# Patient Record
Sex: FEMALE | Race: WHITE | NOT HISPANIC OR LATINO | Employment: UNEMPLOYED | URBAN - METROPOLITAN AREA
[De-identification: names, ages, dates, MRNs, and addresses within clinical notes are randomized per-mention and may not be internally consistent; named-entity substitution may affect disease eponyms.]

---

## 2022-09-08 ENCOUNTER — OFFICE VISIT (OUTPATIENT)
Dept: OBSTETRICS AND GYNECOLOGY | Facility: CLINIC | Age: 32
End: 2022-09-08
Payer: MEDICAID

## 2022-09-08 VITALS
HEART RATE: 82 BPM | WEIGHT: 155.81 LBS | HEIGHT: 65 IN | RESPIRATION RATE: 18 BRPM | OXYGEN SATURATION: 100 % | SYSTOLIC BLOOD PRESSURE: 124 MMHG | BODY MASS INDEX: 25.96 KG/M2 | DIASTOLIC BLOOD PRESSURE: 72 MMHG

## 2022-09-08 DIAGNOSIS — N64.4 BREAST PAIN, LEFT: ICD-10-CM

## 2022-09-08 DIAGNOSIS — R10.2 PELVIC PAIN: ICD-10-CM

## 2022-09-08 DIAGNOSIS — Z01.419 ENCOUNTER FOR ANNUAL ROUTINE GYNECOLOGICAL EXAMINATION: Primary | ICD-10-CM

## 2022-09-08 PROCEDURE — 3008F PR BODY MASS INDEX (BMI) DOCUMENTED: ICD-10-PCS | Mod: CPTII,S$GLB,, | Performed by: ADVANCED PRACTICE MIDWIFE

## 2022-09-08 PROCEDURE — 3074F PR MOST RECENT SYSTOLIC BLOOD PRESSURE < 130 MM HG: ICD-10-PCS | Mod: CPTII,S$GLB,, | Performed by: ADVANCED PRACTICE MIDWIFE

## 2022-09-08 PROCEDURE — 3008F BODY MASS INDEX DOCD: CPT | Mod: CPTII,S$GLB,, | Performed by: ADVANCED PRACTICE MIDWIFE

## 2022-09-08 PROCEDURE — 3044F PR MOST RECENT HEMOGLOBIN A1C LEVEL <7.0%: ICD-10-PCS | Mod: CPTII,S$GLB,, | Performed by: ADVANCED PRACTICE MIDWIFE

## 2022-09-08 PROCEDURE — 3078F DIAST BP <80 MM HG: CPT | Mod: CPTII,S$GLB,, | Performed by: ADVANCED PRACTICE MIDWIFE

## 2022-09-08 PROCEDURE — 99385 PREV VISIT NEW AGE 18-39: CPT | Mod: S$GLB,,, | Performed by: ADVANCED PRACTICE MIDWIFE

## 2022-09-08 PROCEDURE — 88175 CYTOPATH C/V AUTO FLUID REDO: CPT | Performed by: ADVANCED PRACTICE MIDWIFE

## 2022-09-08 PROCEDURE — 87624 HPV HI-RISK TYP POOLED RSLT: CPT | Performed by: ADVANCED PRACTICE MIDWIFE

## 2022-09-08 PROCEDURE — 99385 PR PREVENTIVE VISIT,NEW,18-39: ICD-10-PCS | Mod: S$GLB,,, | Performed by: ADVANCED PRACTICE MIDWIFE

## 2022-09-08 PROCEDURE — 3044F HG A1C LEVEL LT 7.0%: CPT | Mod: CPTII,S$GLB,, | Performed by: ADVANCED PRACTICE MIDWIFE

## 2022-09-08 PROCEDURE — 1159F MED LIST DOCD IN RCRD: CPT | Mod: CPTII,S$GLB,, | Performed by: ADVANCED PRACTICE MIDWIFE

## 2022-09-08 PROCEDURE — 3078F PR MOST RECENT DIASTOLIC BLOOD PRESSURE < 80 MM HG: ICD-10-PCS | Mod: CPTII,S$GLB,, | Performed by: ADVANCED PRACTICE MIDWIFE

## 2022-09-08 PROCEDURE — 87491 CHLMYD TRACH DNA AMP PROBE: CPT | Performed by: ADVANCED PRACTICE MIDWIFE

## 2022-09-08 PROCEDURE — 3074F SYST BP LT 130 MM HG: CPT | Mod: CPTII,S$GLB,, | Performed by: ADVANCED PRACTICE MIDWIFE

## 2022-09-08 PROCEDURE — 87591 N.GONORRHOEAE DNA AMP PROB: CPT | Performed by: ADVANCED PRACTICE MIDWIFE

## 2022-09-08 PROCEDURE — 1159F PR MEDICATION LIST DOCUMENTED IN MEDICAL RECORD: ICD-10-PCS | Mod: CPTII,S$GLB,, | Performed by: ADVANCED PRACTICE MIDWIFE

## 2022-09-08 NOTE — PROGRESS NOTES
"CC: Well woman exam    Sylvia Terry is a 31 y.o. female  presents for well woman exam.  LMP: Patient's last menstrual period was 2022.. Patients last pap was 2021.  Last wellness labs were done 2021. She is a non smoker . Denies any anxiety and depression. She uses a seat belt while riding in the car.  Eating well and exercising.  no sexually active.The current method of family planning is non (not sexually active). Alicia describes pain to left breast for past 2 weeks. Originates deep under armpit area and radiates to nipple. She denies any trauma to breast or surrounding tissues. No discharge from nipples. Additionally, she reports pelvic pain. Pain occurs in adnexal area and radiates to uterus and pubis. At times, affects ability to care for self and special needs child. Pain is "dull" at present time and not acute. Often alternates side occurs upon. Denies vaginal discharge, odor, dysuria, fever/chills, or CVAT. No hx of STDs or abnormal PAP Smears.      Chief Complaint   Patient presents with    Well Woman     Patient is here for annual LPAP 2021 and Blood work        Past Medical History:   Diagnosis Date    Anemia     Asthma     Mental disorder      Past Surgical History:   Procedure Laterality Date     SECTION      KNEE SURGERY      WISDOM TOOTH EXTRACTION       Social History     Socioeconomic History    Marital status: Single   Tobacco Use    Smoking status: Former     Types: Cigarettes    Smokeless tobacco: Never   Substance and Sexual Activity    Alcohol use: Not Currently    Drug use: Never    Sexual activity: Not Currently     Family History   Problem Relation Age of Onset    Breast cancer Maternal Grandmother     Hypertension Father     Diabetes Father     Hypertension Mother     Diabetes Mother      OB History          1    Para   1    Term           1    AB        Living   1         SAB        IAB        Ectopic        Multiple        Live Births " "  1               Current Outpatient Medications on File Prior to Visit   Medication Sig Dispense Refill    multivitamin (THERAGRAN) tablet Take 1 tablet by mouth once daily.       No current facility-administered medications on file prior to visit.        ROS:  Review of Systems   Constitutional: Negative.    HENT: Negative.     Eyes: Negative.    Respiratory: Negative.     Cardiovascular: Negative.    Gastrointestinal: Negative.    Endocrine: Negative.    Genitourinary:  Positive for pelvic pain.   Musculoskeletal: Negative.    Integumentary:  Positive for breast tenderness. Negative.   Neurological: Negative.    Hematological: Negative.    Psychiatric/Behavioral: Negative.     Breast: Positive for breast self exam and tenderness.     /72   Pulse 82   Resp 18   Ht 5' 5" (1.651 m)   Wt 70.7 kg (155 lb 12.8 oz)   LMP 09/02/2022   SpO2 100%   BMI 25.93 kg/m²     PHYSICAL EXAM:  Physical Exam:   Constitutional: She is oriented to person, place, and time. She appears well-developed and well-nourished. No distress.    HENT:   Head: Normocephalic.    Eyes: Right eye exhibits no discharge. Left eye exhibits no discharge. No scleral icterus.    Neck: No tracheal deviation present. No thyromegaly present.    Cardiovascular:  Normal rate, regular rhythm and normal heart sounds.      Exam reveals no gallop, no friction rub, no clubbing, no cyanosis and no edema.       No murmur heard.   Pulmonary/Chest: She is in respiratory distress. She has no wheezes. She has rales. She exhibits no tenderness. Left breast exhibits tenderness.            Abdominal: Soft. She exhibits no distension, no mass and no abdominal incision. There is abdominal tenderness. There is guarding. There is no rebound.   Mild tenderness to right adnexal area with deep palpation.     Genitourinary:    Vagina and uterus normal.   No  no vaginal discharge in the vagina.           Musculoskeletal: Normal range of motion and moves all extremeties. " No tenderness or edema.       Neurological: She is alert and oriented to person, place, and time. No cranial nerve deficit. Coordination normal.    Skin: Skin is warm. No rash noted. She is not diaphoretic. No cyanosis or erythema. No pallor. Nails show no clubbing.    Psychiatric: She has a normal mood and affect. Her behavior is normal. Judgment and thought content normal.      Breast exam: Breasts are symmetrical and fibrocystic in nature. No discharge from nipples or obvious abnormalities. + tenderness from under left armpit to nipple. Mild. See diagram.   Pelvic exam performed. PAP obtained, no abnormal discharge or contact spotting. No CMT. Uterus not enlarged. No significant tenderness to left or right adnexa.      Encounter for annual routine gynecological examination  -     Liquid-Based Pap Smear, Screening  -     HPV High Risk Genotypes, PCR  -     Lipid Panel; Future; Expected date: 09/08/2022  -     Hemoglobin A1C; Future; Expected date: 09/08/2022  -     C. trachomatis/N. gonorrhoeae by AMP DNA [facility]; Cervix    Pelvic pain  -     Cancel: US OB/GYN Procedure ([facility])-Future; Future; Expected date: 09/22/2022  -     US OB/GYN Procedure ([facility])-Future; Future; Expected date: 09/22/2022  -     C. trachomatis/N. gonorrhoeae by AMP DNA [facility]; Cervix    Breast pain, left  -     Cancel: US Breast Left Complete; Future; Expected date: 09/22/2022  -     US Breast Left Complete; Future; Expected date: 09/22/2022    Orders Placed This Encounter   Procedures    HPV High Risk Genotypes, PCR     Release to patient->Immediate     Order Specific Question:   Source     Answer:   [facility]     Order Specific Question:   Release to patient     Answer:   Immediate    C. trachomatis/N. gonorrhoeae by AMP DNA [facility]; Cervix     Sources by Resulting Lab:->[facility]     Order Specific Question:   Sources by Resulting Lab:     Answer:   [facility]     Order Specific Question:   Source:     Answer:   [facility]    US Breast Left  Complete     Standing Status:   Future     Standing Expiration Date:   9/8/2024     Order Specific Question:   Reason for Exam:     Answer:   pain to left breast and left arm pit     Order Specific Question:   Is the patient pregnant?     Answer:   No     Order Specific Question:   Has patient had radiation?     Answer:   No     Order Specific Question:   Has the patient had chemotherapy?     Answer:   No     Order Specific Question:   May the Radiologist modify the order per protocol to meet the clinical needs of the patient?     Answer:   Yes     Order Specific Question:   Does the patient have breast implants?     Answer:   No     Order Specific Question:   Release to patient     Answer:   Immediate    Lipid Panel     Standing Status:   Future     Number of Occurrences:   1     Standing Expiration Date:   9/8/2023    Hemoglobin A1C     Standing Status:   Future     Number of Occurrences:   1     Standing Expiration Date:   9/8/2023    US OB/GYN Procedure ([facility])-Future     Standing Status:   Future     Standing Expiration Date:   9/8/2023     Scheduling Instructions:      Pelvic pain. US of uterus and ovaries     Order Specific Question:   Procedures to be performed:     Answer:   GYN US- Non pregnant     Order Specific Question:   Release to patient     Answer:   Immediate      Annual gyn exam performed.   Wellness labs ordered/discussed.  PAP and GC CHL screening collected.  Discussed pelvic pain c/w ovarian cysts, however a US of uterus/adnexa was ordered as precaution. We discussed ovarian cysts and possible treatments. She does not desire any type of hormonal management and plans NSAIDs PRN.  Discussed breast pain/exam including self SBE and fibrocystic breasts. US of left breasts ordered/discussed.  F/U with me will be based upon lab and US findings. Plan use of [facility] patient portal for communication at present time and she is happy with that.  I did discuss briefly use of condoms for STD prevention.  She is not sexually active at this time and is well versed in STD prevention. Care of her special needs child is main focus in her life.  Importance of healthy diet, regular exercise, and stress management skills discussed.  Recommend annual gyn exam. She recently obtained new primary care provider and plans to see them for all non gyn needs/concerns.

## 2022-09-09 LAB
C TRACH DNA SPEC QL NAA+PROBE: NOT DETECTED
N GONORRHOEA DNA SPEC QL NAA+PROBE: NOT DETECTED

## 2022-09-15 ENCOUNTER — TELEPHONE (OUTPATIENT)
Dept: CARDIOTHORACIC SURGERY | Facility: CLINIC | Age: 32
End: 2022-09-15
Payer: MEDICAID

## 2022-09-15 ENCOUNTER — TELEPHONE (OUTPATIENT)
Dept: GASTROENTEROLOGY | Facility: CLINIC | Age: 32
End: 2022-09-15
Payer: MEDICAID

## 2022-09-15 DIAGNOSIS — N64.4 BREAST PAIN, LEFT: Primary | ICD-10-CM

## 2022-09-15 DIAGNOSIS — R10.2 PELVIC PAIN: Primary | ICD-10-CM

## 2022-09-15 LAB
FINAL PATHOLOGIC DIAGNOSIS: NORMAL
Lab: NORMAL

## 2022-09-15 NOTE — TELEPHONE ENCOUNTER
Spoke with patient.  Aware I did not see a referral that came through.   Also aware, the physicians in this department do not except Medicaid insurance.   Did offer the numbers to [facility] and [facility] and she declined.   Patti

## 2022-09-15 NOTE — TELEPHONE ENCOUNTER
----- Message from Vicki Crow MA sent at 9/15/2022  1:55 PM CDT -----  Contact: pt  Pt called this morning to try to schedule an appt, her doctor sent over the referral today and pt is wondering the timeline on when it will be uploaded and approved in system. She is trying to schedule an appt next Tuesday, if possible. Her daughter has an appt and she would like to do all appts on the same day.She is requesting a call back to confirm that the referral has been received by the office.      Confirmed contact below:  Contact Name:Sylvia Terry  Phone Number: [phone]

## 2022-09-15 NOTE — TELEPHONE ENCOUNTER
Returned call to pt. Pt was waiting for a call from the GI department about a referral that was sent in for her, not Cardiovascular Surgery. Provided pt with number to GI department.         ----- Message from Rochelle Portillo sent at 9/15/2022  1:30 PM CDT -----  Contact: pt  Pt called this morning to try to schedule an appt, her doctor sent over the referral today and pt is wondering the timeline on when it will be uploaded and approved in system. She is trying to schedule an appt next Tuesday, if possible. Her daughter has an appt and she would like to do all appts on the same day.She is requesting a call back to confirm that the referral has been received by the office.     Confirmed contact below:  Contact Name:Sylvia Terry  Phone Number: [phone]

## 2022-09-19 LAB
HPV HR 12 DNA SPEC QL NAA+PROBE: NEGATIVE
HPV16 AG SPEC QL: NEGATIVE
HPV18 DNA SPEC QL NAA+PROBE: NEGATIVE

## 2022-10-28 ENCOUNTER — TELEPHONE (OUTPATIENT)
Dept: OBSTETRICS AND GYNECOLOGY | Facility: CLINIC | Age: 32
End: 2022-10-28
Payer: MEDICAID

## 2022-10-28 NOTE — TELEPHONE ENCOUNTER
Notified pt of mammogram and breast ultrasound results; need for short term follow up in 6 months.  Pt v/u.

## 2023-05-19 DIAGNOSIS — R92.8 ABNORMAL MAMMOGRAM: Primary | ICD-10-CM

## 2023-06-05 DIAGNOSIS — R92.8 ABNORMAL MAMMOGRAM: Primary | ICD-10-CM

## 2025-06-26 ENCOUNTER — TELEPHONE (OUTPATIENT)
Dept: FAMILY MEDICINE | Facility: CLINIC | Age: 35
End: 2025-06-26
Payer: MEDICAID

## 2025-06-26 NOTE — TELEPHONE ENCOUNTER
Copied from CRM #[id number]. Topic: Appointments - Appointment Access  >> Jun 26, 2025  2:09 PM Merna wrote:  Type:  Sooner Appointment Request    Caller is requesting a sooner appointment.  Caller declined first available appointment listed below.  Caller will not accept being placed on the waitlist and is requesting a message be sent to doctor.    Name of Caller:  Pt   When is the first available appointment?  No blocks found   Symptoms:  Est care   Would the patient rather a call back or a response via [facility]? Either   Best Call Back Number:  [phone]    Additional Information:  [facility] call back

## 2025-07-07 ENCOUNTER — OFFICE VISIT (OUTPATIENT)
Dept: OBSTETRICS AND GYNECOLOGY | Facility: CLINIC | Age: 35
End: 2025-07-07
Payer: MEDICAID

## 2025-07-07 VITALS
DIASTOLIC BLOOD PRESSURE: 78 MMHG | WEIGHT: 177 LBS | BODY MASS INDEX: 29.49 KG/M2 | SYSTOLIC BLOOD PRESSURE: 119 MMHG | HEART RATE: 71 BPM | HEIGHT: 65 IN

## 2025-07-07 DIAGNOSIS — N64.4 BREAST PAIN, LEFT: ICD-10-CM

## 2025-07-07 DIAGNOSIS — Z87.898 HISTORY OF ABNORMAL MAMMOGRAM: ICD-10-CM

## 2025-07-07 DIAGNOSIS — N63.25 BREAST LUMP ON LEFT SIDE AT 12 O'CLOCK POSITION: ICD-10-CM

## 2025-07-07 DIAGNOSIS — Z76.89 ENCOUNTER TO ESTABLISH CARE WITH NEW PROVIDER: Primary | ICD-10-CM

## 2025-07-07 DIAGNOSIS — Z80.3 FAMILY HISTORY OF BREAST CANCER IN FEMALE: ICD-10-CM

## 2025-07-07 PROCEDURE — 99999 PR PBB SHADOW E&M-EST. PATIENT-LVL III: CPT | Mod: PBBFAC,,,

## 2025-07-07 PROCEDURE — 1160F RVW MEDS BY RX/DR IN RCRD: CPT | Mod: CPTII,,,

## 2025-07-07 PROCEDURE — 3008F BODY MASS INDEX DOCD: CPT | Mod: CPTII,,,

## 2025-07-07 PROCEDURE — 1159F MED LIST DOCD IN RCRD: CPT | Mod: CPTII,,,

## 2025-07-07 PROCEDURE — 99214 OFFICE O/P EST MOD 30 MIN: CPT | Mod: S$PBB,,,

## 2025-07-07 PROCEDURE — 99213 OFFICE O/P EST LOW 20 MIN: CPT | Mod: PBBFAC

## 2025-07-07 PROCEDURE — 3074F SYST BP LT 130 MM HG: CPT | Mod: CPTII,,,

## 2025-07-07 PROCEDURE — 3078F DIAST BP <80 MM HG: CPT | Mod: CPTII,,,

## 2025-07-07 RX ORDER — ALBUTEROL SULFATE 90 UG/1
INHALANT RESPIRATORY (INHALATION)
COMMUNITY

## 2025-07-07 NOTE — PROGRESS NOTES
"Gynecology Visit  History & Physical      SUBJECTIVE:     History of Present Illness:  Patient is a 34 y.o. female presents today to establish care with new provider and get order for breast imaging.  Patient reports a strong maternal family history of breast cancer and history of abnormal mammogram with diagnostic ultrasounds.  Patient does complain of pain to left breast radiating through axilla.  Denies any nipple discharge, inverted nipple, or red streaking of breast.  Patients previous images were performed at [facility].  Patient also interested in BRCA testing information.  Pap smear performed in 2025 and WNL per patient.      Chief Complaint   Patient presents with    requesting Mammogram order     Pt has strong family history of breast cancer        Review of patient's allergies indicates:   Allergen Reactions    Penicillins Itching and Other (See Comments)     Reaction as a baby       Current Medications[1]  OB History          1    Para   1    Term           1    AB        Living   1         SAB        IAB        Ectopic        Multiple        Live Births   1             Patient's last menstrual period was 2025 (approximate).      Past Medical History:   Diagnosis Date    Anemia     Asthma     Mental disorder      Past Surgical History:   Procedure Laterality Date     SECTION      KNEE SURGERY      WISDOM TOOTH EXTRACTION       Family History   Problem Relation Name Age of Onset    Breast cancer Maternal Grandmother      Colon cancer Maternal Grandfather      Hypertension Father      Diabetes Father      Hypertension Mother      Diabetes Mother      Breast cancer Maternal Great-Grandmother      Uterine cancer Neg Hx      Ovarian cysts Neg Hx       Social History[2]     OBJECTIVE:     Vital Signs (Most Recent)  Pulse: 71 (25 1051)  BP: 119/78 (25 1051)  5' 5" (1.651 m)  80.3 kg (177 lb)     Physical Exam:  Physical Exam  Vitals and nursing note reviewed. "   Constitutional:       General: She is awake.   Pulmonary:      Effort: Pulmonary effort is normal.   Chest:   Breasts:     Right: Normal.      Left: Mass and tenderness (slight tenderness noted with palpation of upper left breast) present.          Comments: Multiple small lumps noted to left breast 12:00 position   Skin:     General: Skin is warm and dry.   Neurological:      Mental Status: She is alert and oriented to person, place, and time.   Psychiatric:         Attention and Perception: Attention and perception normal.         Mood and Affect: Mood and affect normal.         Speech: Speech normal.         Behavior: Behavior normal. Behavior is cooperative.         Thought Content: Thought content normal.         Cognition and Memory: Cognition normal.         Judgment: Judgment normal.       ASSESSMENT/PLAN:       ICD-10-CM ICD-9-CM   1. Encounter to establish care with new provider  Z76.89 V65.8   2. History of abnormal mammogram  Z87.898 V15.89   3. Breast lump on left side at 12 o'clock position  N63.25 611.72   4. Breast pain, left  N64.4 611.71       PLAN:  Records release  signed by patient to get records from [facility]  Diagnostic breast imaging orders placed today, patient to call to schedule  Order placed for BRCA testing patient to consider  Referral sent to establish care with Dr. Conner   Last Pap: approximate date 02/2025 and was normal per pt  Follow up in 1 year for annual exam or sooner as needed    Thank you for allowing me to participate in your care today. It is an honor to be a part of your healthcare team at [facility]. If you have any questions or concerns regarding your visit today, please do not hesitate to contact us.    Briseyda Ledesma, [facility]  Gynecology    [address] [place] [address], [place] [address] [phone]         [1]   Current Outpatient Medications   Medication Sig Dispense Refill    albuterol (PROVENTIL/VENTOLIN HFA) 90 mcg/actuation inhaler albuterol sulfate HFA 90  mcg/actuation aerosol inhaler   Inhale 2 puffs every 6-8 hours by inhalation route as needed.      multivitamin (THERAGRAN) tablet Take 1 tablet by mouth once daily.       No current facility-administered medications for this visit.   [2]   Social History  Tobacco Use    Smoking status: Former     Types: Cigarettes     Passive exposure: Never    Smokeless tobacco: Never   Substance Use Topics    Alcohol use: Not Currently    Drug use: Never

## 2025-07-09 ENCOUNTER — PATIENT MESSAGE (OUTPATIENT)
Dept: FAMILY MEDICINE | Facility: CLINIC | Age: 35
End: 2025-07-09
Payer: MEDICAID

## 2025-07-21 ENCOUNTER — HOSPITAL ENCOUNTER (OUTPATIENT)
Dept: RADIOLOGY | Facility: HOSPITAL | Age: 35
Discharge: HOME | End: 2025-07-21
Payer: MEDICAID

## 2025-07-21 DIAGNOSIS — Z87.898 HISTORY OF ABNORMAL MAMMOGRAM: ICD-10-CM

## 2025-07-21 DIAGNOSIS — N64.4 BREAST PAIN, LEFT: ICD-10-CM

## 2025-07-21 DIAGNOSIS — N63.25 BREAST LUMP ON LEFT SIDE AT 12 O'CLOCK POSITION: ICD-10-CM

## 2025-07-21 PROCEDURE — 77066 DX MAMMO INCL CAD BI: CPT | Mod: 26,,, | Performed by: RADIOLOGY

## 2025-07-21 PROCEDURE — 77062 BREAST TOMOSYNTHESIS BI: CPT | Mod: 26,,, | Performed by: RADIOLOGY

## 2025-07-21 PROCEDURE — 76642 ULTRASOUND BREAST LIMITED: CPT | Mod: TC,LT

## 2025-07-21 PROCEDURE — 77066 DX MAMMO INCL CAD BI: CPT | Mod: TC

## 2025-07-21 PROCEDURE — 76642 ULTRASOUND BREAST LIMITED: CPT | Mod: 26,LT,, | Performed by: RADIOLOGY

## 2025-07-24 ENCOUNTER — RESULTS FOLLOW-UP (OUTPATIENT)
Dept: OBSTETRICS AND GYNECOLOGY | Facility: CLINIC | Age: 35
End: 2025-07-24
Payer: MEDICAID

## 2025-07-29 ENCOUNTER — LAB VISIT (OUTPATIENT)
Dept: LAB | Facility: HOSPITAL | Age: 35
End: 2025-07-29
Payer: MEDICAID

## 2025-07-29 ENCOUNTER — OFFICE VISIT (OUTPATIENT)
Dept: FAMILY MEDICINE | Facility: CLINIC | Age: 35
End: 2025-07-29
Payer: MEDICAID

## 2025-07-29 ENCOUNTER — PATIENT MESSAGE (OUTPATIENT)
Dept: FAMILY MEDICINE | Facility: CLINIC | Age: 35
End: 2025-07-29

## 2025-07-29 VITALS
OXYGEN SATURATION: 97 % | TEMPERATURE: 98 F | BODY MASS INDEX: 29.22 KG/M2 | RESPIRATION RATE: 18 BRPM | HEART RATE: 72 BPM | HEIGHT: 65 IN | SYSTOLIC BLOOD PRESSURE: 118 MMHG | WEIGHT: 175.38 LBS | DIASTOLIC BLOOD PRESSURE: 80 MMHG

## 2025-07-29 DIAGNOSIS — Z13.1 SCREENING FOR DIABETES MELLITUS: ICD-10-CM

## 2025-07-29 DIAGNOSIS — R63.5 WEIGHT GAIN: ICD-10-CM

## 2025-07-29 DIAGNOSIS — Z13.29 SCREENING FOR THYROID DISORDER: ICD-10-CM

## 2025-07-29 DIAGNOSIS — Z83.49 FAMILY HISTORY OF THYROID DISEASE IN SISTER: ICD-10-CM

## 2025-07-29 DIAGNOSIS — Z86.59 HISTORY OF BIPOLAR DISORDER: ICD-10-CM

## 2025-07-29 DIAGNOSIS — E61.7 DEFICIENCY OF MULTIPLE NUTRIENT ELEMENTS: ICD-10-CM

## 2025-07-29 DIAGNOSIS — Z13.220 SCREENING, LIPID: ICD-10-CM

## 2025-07-29 DIAGNOSIS — Z76.89 ENCOUNTER TO ESTABLISH CARE WITH NEW PROVIDER: ICD-10-CM

## 2025-07-29 DIAGNOSIS — E65 ABDOMINAL OBESITY: ICD-10-CM

## 2025-07-29 DIAGNOSIS — R53.83 FATIGUE, UNSPECIFIED TYPE: ICD-10-CM

## 2025-07-29 DIAGNOSIS — Z80.3 FAMILY HISTORY OF BREAST CANCER IN FEMALE: ICD-10-CM

## 2025-07-29 DIAGNOSIS — R53.83 FATIGUE, UNSPECIFIED TYPE: Primary | ICD-10-CM

## 2025-07-29 DIAGNOSIS — E01.0 THYROMEGALY: ICD-10-CM

## 2025-07-29 LAB
(HCYS)2 SERPL-MCNC: 14.4 UMOL/L (ref 4–15.5)
25(OH)D3+25(OH)D2 SERPL-MCNC: 32 NG/ML (ref 30–96)
ABSOLUTE EOSINOPHIL (OHS): 0.07 K/UL
ABSOLUTE MONOCYTE (OHS): 0.41 K/UL (ref 0.3–1)
ABSOLUTE NEUTROPHIL COUNT (OHS): 3.1 K/UL (ref 1.8–7.7)
ALBUMIN SERPL BCP-MCNC: 4.4 G/DL (ref 3.5–5.2)
ALP SERPL-CCNC: 82 UNIT/L (ref 40–150)
ALT SERPL W/O P-5'-P-CCNC: 20 UNIT/L (ref 10–44)
ANION GAP (OHS): 8 MMOL/L (ref 8–16)
AST SERPL-CCNC: 109 UNIT/L (ref 11–45)
BASOPHILS # BLD AUTO: 0.05 K/UL
BASOPHILS NFR BLD AUTO: 1.1 %
BILIRUB SERPL-MCNC: 0.5 MG/DL (ref 0.1–1)
BUN SERPL-MCNC: 11 MG/DL (ref 6–20)
CALCIUM SERPL-MCNC: 9.3 MG/DL (ref 8.7–10.5)
CHLORIDE SERPL-SCNC: 104 MMOL/L (ref 95–110)
CHOLEST SERPL-MCNC: 154 MG/DL (ref 120–199)
CHOLEST/HDLC SERPL: 2.8 {RATIO} (ref 2–5)
CO2 SERPL-SCNC: 26 MMOL/L (ref 23–29)
CREAT SERPL-MCNC: 0.8 MG/DL (ref 0.5–1.4)
EAG (OHS): 103 MG/DL (ref 68–131)
ERYTHROCYTE [DISTWIDTH] IN BLOOD BY AUTOMATED COUNT: 13.6 % (ref 11.5–14.5)
FERRITIN SERPL-MCNC: 10 NG/ML (ref 20–300)
GFR SERPLBLD CREATININE-BSD FMLA CKD-EPI: >60 ML/MIN/1.73/M2
GLUCOSE SERPL-MCNC: 90 MG/DL (ref 70–110)
HBA1C MFR BLD: 5.2 % (ref 4–5.6)
HCT VFR BLD AUTO: 41.7 % (ref 37–48.5)
HDLC SERPL-MCNC: 55 MG/DL (ref 40–75)
HDLC SERPL: 35.7 % (ref 20–50)
HGB BLD-MCNC: 13.2 GM/DL (ref 12–16)
IMM GRANULOCYTES # BLD AUTO: 0.01 K/UL (ref 0–0.04)
IMM GRANULOCYTES NFR BLD AUTO: 0.2 % (ref 0–0.5)
IRON SATN MFR SERPL: 11 % (ref 20–50)
IRON SERPL-MCNC: 46 UG/DL (ref 30–160)
LDLC SERPL CALC-MCNC: 89.2 MG/DL (ref 63–159)
LYMPHOCYTES # BLD AUTO: 1.05 K/UL (ref 1–4.8)
MAGNESIUM SERPL-MCNC: 2 MG/DL (ref 1.6–2.6)
MCH RBC QN AUTO: 28.9 PG (ref 27–31)
MCHC RBC AUTO-ENTMCNC: 31.7 G/DL (ref 32–36)
MCV RBC AUTO: 91 FL (ref 82–98)
NONHDLC SERPL-MCNC: 99 MG/DL
NUCLEATED RBC (/100WBC) (OHS): 0 /100 WBC
PLATELET # BLD AUTO: 232 K/UL (ref 150–450)
PMV BLD AUTO: 12.9 FL (ref 9.2–12.9)
POTASSIUM SERPL-SCNC: 4.1 MMOL/L (ref 3.5–5.1)
PROT SERPL-MCNC: 8.2 GM/DL (ref 6–8.4)
RBC # BLD AUTO: 4.56 M/UL (ref 4–5.4)
RELATIVE EOSINOPHIL (OHS): 1.5 %
RELATIVE LYMPHOCYTE (OHS): 22.4 % (ref 18–48)
RELATIVE MONOCYTE (OHS): 8.7 % (ref 4–15)
RELATIVE NEUTROPHIL (OHS): 66.1 % (ref 38–73)
SODIUM SERPL-SCNC: 138 MMOL/L (ref 136–145)
T3FREE SERPL-MCNC: 2.9 PG/ML (ref 2.3–4.2)
T4 FREE SERPL-MCNC: 0.99 NG/DL (ref 0.71–1.51)
T4 FREE SERPL-MCNC: 0.99 NG/DL (ref 0.71–1.51)
TIBC SERPL-MCNC: 426 UG/DL (ref 250–450)
TRANSFERRIN SERPL-MCNC: 288 MG/DL (ref 200–375)
TRIGL SERPL-MCNC: 49 MG/DL (ref 30–150)
TSH SERPL-ACNC: 6.68 UIU/ML (ref 0.4–4)
WBC # BLD AUTO: 4.69 K/UL (ref 3.9–12.7)

## 2025-07-29 PROCEDURE — 82728 ASSAY OF FERRITIN: CPT

## 2025-07-29 PROCEDURE — 82652 VIT D 1 25-DIHYDROXY: CPT

## 2025-07-29 PROCEDURE — 80061 LIPID PANEL: CPT

## 2025-07-29 PROCEDURE — 84482 T3 REVERSE: CPT

## 2025-07-29 PROCEDURE — 84443 ASSAY THYROID STIM HORMONE: CPT

## 2025-07-29 PROCEDURE — 83090 ASSAY OF HOMOCYSTEINE: CPT

## 2025-07-29 PROCEDURE — 84481 FREE ASSAY (FT-3): CPT

## 2025-07-29 PROCEDURE — 83036 HEMOGLOBIN GLYCOSYLATED A1C: CPT

## 2025-07-29 PROCEDURE — 83735 ASSAY OF MAGNESIUM: CPT

## 2025-07-29 PROCEDURE — 84591 ASSAY OF NOS VITAMIN: CPT

## 2025-07-29 PROCEDURE — 82306 VITAMIN D 25 HYDROXY: CPT

## 2025-07-29 PROCEDURE — 86665 EPSTEIN-BARR CAPSID VCA: CPT | Mod: 59

## 2025-07-29 PROCEDURE — 86340 INTRINSIC FACTOR ANTIBODY: CPT

## 2025-07-29 PROCEDURE — 85025 COMPLETE CBC W/AUTO DIFF WBC: CPT

## 2025-07-29 PROCEDURE — 84155 ASSAY OF PROTEIN SERUM: CPT

## 2025-07-29 PROCEDURE — 36415 COLL VENOUS BLD VENIPUNCTURE: CPT | Mod: PN

## 2025-07-29 PROCEDURE — 99999 PR PBB SHADOW E&M-EST. PATIENT-LVL IV: CPT | Mod: PBBFAC,,, | Performed by: FAMILY MEDICINE

## 2025-07-29 PROCEDURE — 83921 ORGANIC ACID SINGLE QUANT: CPT

## 2025-07-29 PROCEDURE — 99214 OFFICE O/P EST MOD 30 MIN: CPT | Mod: PBBFAC,PN | Performed by: FAMILY MEDICINE

## 2025-07-29 PROCEDURE — 82607 VITAMIN B-12: CPT

## 2025-07-29 PROCEDURE — 83540 ASSAY OF IRON: CPT

## 2025-07-29 PROCEDURE — 82941 ASSAY OF GASTRIN: CPT

## 2025-07-29 PROCEDURE — 84439 ASSAY OF FREE THYROXINE: CPT

## 2025-07-29 NOTE — PROGRESS NOTES
Subjective:   Hematology   Results  Most recent to oldest [Reference Range]: 1   WBC [4.50-11.00 x10(3)/mcL] 4.71 x10(3)/mcL  (5/7/25 8:10 AM)   RBC [4.00-5.00 x10(6)/mcL] 4.33 x10(6)/mcL  (5/7/25 8:10 AM)   Hgb [12.0-16.0 gm/dL] 12.7 gm/dL  (5/7/25 8:10 AM)   Hct [36.0-46.0 %] 39.6 %  (5/7/25 8:10 AM)   MCV [80.0-100.0 fL] 91.5 fL  (5/7/25 8:10 AM)   MCH [28.0-34.0 PD] 29.3 PD  (5/7/25 8:10 AM)   MCHC [32.0-36.0 gm/dL] 32.1 gm/dL  (5/7/25 8:10 AM)   RDW-SD [38.4-47.8 fL] 43.5 fL  (5/7/25 8:10 AM)   RDW-CV [12.0-14.6 %] 13.0 %  (5/7/25 8:10 AM)   Platelet [150-400 x10(3)/mcL] 232 x10(3)/mcL  (5/7/25 8:10 AM)   MPV [9.4-12.4 fL] 13.2 fL  *H*  (5/7/25 8:10 AM)   NRBC % [0.0-0.2 %] 0.0 %  (5/7/25 8:10 AM)   NRBC # [0.00-0.01 x10(3)/mcL] 0.00 x10(3)/mcL  (5/7/25 8:10 AM)   Neutrophil % [40.0-75.0 %] 47.6 %  (5/7/25 8:10 AM)   Lymphocyte % [20.0-51.0 %] 37.2 %  (5/7/25 8:10 AM)   Monocyte % [2.0-10.0 %] 11.0 %  *H*  (5/7/25 8:10 AM)   Eosinophil % [0.0-5.0 %] 3.2 %  (5/7/25 8:10 AM)   Basophil % [0.0-1.0 %] 0.8 %  (5/7/25 8:10 AM)   Imm Grans % [0.00-0.50 %] 0.20 %  (5/7/25 8:10 AM)   Neutrophil Ct [1.80-8.30 x10(3)/mcL] 2.24 x10(3)/mcL  (5/7/25 8:10 AM)   Lymphocyte Ct [1.20-5.60 x10(3)/mcL] 1.75 x10(3)/mcL  (5/7/25 8:10 AM)   Monocyte Ct [0.10-0.80 x10(3)/mcL] 0.52 x10(3)/mcL  (5/7/25 8:10 AM)   Eosinophil Ct [0.00-0.40 x10(3)/mcL] 0.15 x10(3)/mcL  (5/7/25 8:10 AM)   Basophil Ct [0.00-0.20 x10(3)/mcL] 0.04 x10(3)/mcL  (5/7/25 8:10 AM)   Imm Grans Ct [0.00-0.03 x10(3)/mcL] 0.01 x10(3)/mcL  (5/7/25 8:10 AM)         Hematology   Results  Most recent to oldest [Reference Range]: 1   WBC [4.50-11.00 x10(3)/mcL] 3.78 x10(3)/mcL  *L*  (4/9/25 8:52 AM)   RBC [4.00-5.00 x10(6)/mcL] 4.29 x10(6)/mcL  (4/9/25 8:52 AM)   Hgb [12.0-16.0 gm/dL] 12.3 gm/dL  (4/9/25 8:52 AM)   Hct [36.0-46.0 %] 39.7 %  (4/9/25 8:52 AM)   MCV [80.0-100.0 fL] 92.5 fL  (4/9/25 8:52 AM)   MCH [28.0-34.0 PD] 28.7 PD  (4/9/25 8:52 AM)   MCHC [32.0-36.0  gm/dL] 31.0 gm/dL  *L*  (4/9/25 8:52 AM)   RDW-SD [38.4-47.8 fL] 45.1 fL  (4/9/25 8:52 AM)   RDW-CV [12.0-14.6 %] 13.4 %  (4/9/25 8:52 AM)   Platelet [150-400 x10(3)/mcL] 240 x10(3)/mcL  (4/9/25 8:52 AM)   MPV [9.4-12.4 fL] 12.8 fL  *H*  (4/9/25 8:52 AM)   NRBC % [0.0-0.2 %] 0.0 %  (4/9/25 8:52 AM)   NRBC # [0.00-0.01 x10(3)/mcL] 0.00 x10(3)/mcL  (4/9/25 8:52 AM)   Neutrophil % [40.0-75.0 %] 52.0 %  (4/9/25 8:52 AM)   Lymphocyte % [20.0-51.0 %] 33.9 %  (4/9/25 8:52 AM)   Monocyte % [2.0-10.0 %] 9.8 %  (4/9/25 8:52 AM)   Eosinophil % [0.0-5.0 %] 2.9 %  (4/9/25 8:52 AM)   Basophil % [0.0-1.0 %] 1.1 %  *H*  (4/9/25 8:52 AM)   Imm Grans % [0.00-0.50 %] 0.30 %  (4/9/25 8:52 AM)   Neutrophil Ct [1.80-8.30 x10(3)/mcL] 1.97 x10(3)/mcL  (4/9/25 8:52 AM)   Lymphocyte Ct [1.20-5.60 x10(3)/mcL] 1.28 x10(3)/mcL  (4/9/25 8:52 AM)   Monocyte Ct [0.10-0.80 x10(3)/mcL] 0.37 x10(3)/mcL  (4/9/25 8:52 AM)   Eosinophil Ct [0.00-0.40 x10(3)/mcL] 0.11 x10(3)/mcL  (4/9/25 8:52 AM)   Basophil Ct [0.00-0.20 x10(3)/mcL] 0.04 x10(3)/mcL  (4/9/25 8:52 AM)   Imm Grans Ct [0.00-0.03 x10(3)/mcL] 0.01 x10(3)/mcL  (4/9/25 8:52 AM)      Chemistry   Results  Most recent to oldest [Reference Range]: 1   Glucose Lvl [ mg/dL] 93 mg/dL  (4/9/25 8:52 AM)   BUN [7-21 mg/dL] 10 mg/dL  (4/9/25 8:52 AM)   Creatinine Lvl [0.50-1.40 mg/dL] 0.80 mg/dL  (4/9/25 8:52 AM)   BUN/Crea [6.0-20.0 ratio] 12.5 ratio  (4/9/25 8:52 AM)   Sodium Lvl [136-145 mmol/L] 139 mmol/L  (4/9/25 8:52 AM)   Potassium Level [3.5-5.1 mmol/L] 4.2 mmol/L  (4/9/25 8:52 AM)   Chloride [101-111 mmol/L] 106 mmol/L  (4/9/25 8:52 AM)   CO2 [22-31 mmol/L] 26 mmol/L  (4/9/25 8:52 AM)   AGAP [8.0-16.0 mmol/L] 7.0 mmol/L  *L*  (4/9/25 8:52 AM)   Calcium Lvl [8.4-10.2 mg/dL] 8.8 mg/dL  (4/9/25 8:52 AM)   Total Protein [6.0-7.8 gm/dL] 7.4 gm/dL  (4/9/25 8:52 AM)   Albumin [3.5-5.0 gm/dL] 4.2 gm/dL  (4/9/25 8:52 AM)   A/G Ratio [1.0-3.0 ratio] 1.3 ratio  (4/9/25 8:52 AM)   Alk Phos [  unit/L] 82 unit/L  (4/9/25 8:52 AM)   ALT [7-56 unit/L] 15 unit/L  (4/9/25 8:52 AM)   AST [5-35 unit/L] 22 unit/L  (4/9/25 8:52 AM)   Bili Total [0.2-1.2 mg/dL] 0.5 mg/dL  (4/9/25 8:52 AM)   Amylase Lvl [ unit/L] 41 unit/L  (4/9/25 8:52 AM)   Lipase Lvl [ unit/L] 32 unit/L  (4/9/25 8:52 AM)   eGFRcr 98 mL/min/1.73 m2 1  *NA*  (4/9/25 8:52 AM)   Chol [0-199 mg/dL] 146 mg/dL 2  (4/9/25 8:52 AM)   HDL 52 mg/dL 3  *NA*  (4/9/25 8:52 AM)   Chol/HDL 3 ratio 4  *NA*  (4/9/25 8:52 AM)   Trig [<=199 mg/dL] 35 mg/dL 5  (4/9/25 8:52 AM)   LDL Direct 103 mg/dL 6  *NA*  (4/9/25 8:52 AM)   LDL/HDL Ratio 2 ratio  *NA*  (4/9/25 8:52 AM)   TSH [0.49-4.67 mcIU/mL] 3.80 mcIU/mL  (4/9/25 8:52 AM)      Urinalysis   Results  Most recent to oldest [Reference Range]: 1   UA Color [Yellow] Yellow  (4/9/25 8:52 AM)   UA Appear [Clear] Cloudy    (4/9/25 8:52 AM)   UA Spec Grav [1.002-1.030] 1.026  (4/9/25 8:52 AM)   UA Leuk Est [Negative] Negative  (4/9/25 8:52 AM)   UA Nitrite [Negative] Negative  (4/9/25 8:52 AM)   UA pH [5.0-8.0] 5.0  (4/9/25 8:52 AM)   UA Protein [Negative] Negative  (4/9/25 8:52 AM)   UA Glucose [Negative] Negative  (4/9/25 8:52 AM)   UA Ketones [Negative] Trace    (4/9/25 8:52 AM)   UA Urobilinogen [0.1-1.9 EU/DL] 0.2 EU/DL  (4/9/25 8:52 AM)   UA Bili [Negative] Negative  (4/9/25 8:52 AM)   UA Blood [Negative] Moderate    (4/9/25 8:52 AM)   UA RBC [0-2 /HPF] 0-2 /HPF  (4/9/25 8:52 AM)   UA WBC [0-5 /HPF] 0-5 /HPF  (4/9/25 8:52 AM)   UA Squam Epi [0-2 /HPF] 11-20 /HPF    (4/9/25 8:52 AM)   UA Hyal Cast [0-2 /HPF] 0-2 /HPF  (4/9/25 8:52 AM)   UA Bacteria Moderate /HPF    (4/9/25 8:52 AM)   UA CaOxal Cry Present    (4/9/25 8:52 AM)   UA Mucous Present    (4/9/25 8:52 AM)     Hematology   Results  Most recent to oldest [Reference Range]: 1   WBC [4.50-11.00 x10(3)/mcL] 5.12 x10(3)/mcL  (5/18/23 8:49 AM)   RBC [4.00-5.00 x10(6)/mcL] 4.28 x10(6)/mcL  (5/18/23 8:49 AM)   Hgb [12.0-16.0 gm/dL] 12.8 gm/dL  (5/18/23  8:49 AM)   Hct [36.0-46.0 %] 40.9 %  (5/18/23 8:49 AM)   MCV [80.0-100.0 fL] 95.6 fL  (5/18/23 8:49 AM)   MCH [28.0-34.0 pg] 29.9 pg  (5/18/23 8:49 AM)   MCHC [32.0-36.0 gm/dL] 31.3 gm/dL  *L*  (5/18/23 8:49 AM)   RDW-SD [38.4-47.8 fL] 47.2 fL  (5/18/23 8:49 AM)   RDW-CV [12.0-14.6 %] 13.4 %  (5/18/23 8:49 AM)   Platelet [150-400 x10(3)/mcL] 161 x10(3)/mcL  (5/18/23 8:49 AM)   MPV [9.4-12.4 fL] 13.4 fL  *H*  (5/18/23 8:49 AM)   NRBC % [0.0-0.2 %] 0.0 %  (5/18/23 8:49 AM)   NRBC # [0.000-0.010 x10(3)/mcL] 0.000 x10(3)/mcL  (5/18/23 8:49 AM)   Neutrophil % [40.0-75.0 %] 61.3 %  (5/18/23 8:49 AM)   Lymphocyte % [20.0-51.0 %] 25.6 %  (5/18/23 8:49 AM)   Monocyte % [2.0-10.0 %] 9.8 %  (5/18/23 8:49 AM)   Eosinophil % [0.0-5.0 %] 2.3 %  (5/18/23 8:49 AM)   Basophil % [0.0-1.0 %] 0.8 %  (5/18/23 8:49 AM)   Imm Grans % [0.00-0.50 %] 0.20 %  (5/18/23 8:49 AM)   Neutrophil Ct [1.80-8.30 x10(3)/mcL] 3.14 x10(3)/mcL  (5/18/23 8:49 AM)   Lymphocyte Ct [1.20-5.60 x10(3)/mcL] 1.31 x10(3)/mcL  (5/18/23 8:49 AM)   Monocyte Ct [0.10-0.80 x10(3)/mcL] 0.50 x10(3)/mcL  (5/18/23 8:49 AM)   Eosinophil Ct [0.00-0.40 x10(3)/mcL] 0.12 x10(3)/mcL  (5/18/23 8:49 AM)   Basophil Ct [0.00-0.20 x10(3)/mcL] 0.04 x10(3)/mcL  (5/18/23 8:49 AM)   Imm Grans Ct [0.0034-0.0310 x10(3)/mcL] 0.0100 x10(3)/mcL  (5/18/23 8:49 AM)         Patient ID: Sylvia Terry is a 34 y.o. female.    Chief Complaint: Establish Care    HPI    History of Present Illness    CHIEF COMPLAINT:  Patient presents today for second opinion regarding ongoing undiagnosed symptoms.    HISTORY OF PRESENT ILLNESS:  She reports right-sided pain that started in 2022, initially around the rib area, which was severe enough to cause discomfort with bra wearing. She currently experiences persistent pelvic and abdominal pain described as bone-like, with intermittent shooting pain when walking. Pain is non-cyclical, occurring randomly and not specifically related to menstrual cycle or  ovulation. She also reports occasional chest pain with associated palpitations that trigger coughing, occurring randomly, including at night. Pain varies in intensity, sometimes constant and other times subsiding. She expresses concern about the underlying cause of these multiple pain symptoms and seeks further investigation.    PREVIOUS DIAGNOSTIC WORKUP:  She has undergone multiple diagnostic imaging studies. Abdominal imaging at [facility] evaluated gallbladder, liver, and appendix with no significant findings. Colonoscopy was performed to rule out abdominal pathology and returned normal. Transvaginal and abdominal ultrasounds were completed, with most recent ultrasound performed at the beginning of the year showing no acute abnormalities. She mentions having ovarian cysts but no further details were provided about their characteristics or management.    HEMATOLOGIC:  She reports a history of low white blood count and experiences periods of significant fatigue associated with these low counts. Medical providers have been monitoring her white blood cell levels. Recent lab values show white blood cell count fluctuating between 3.78 and 4.71, which are not alarmingly low. The white blood cell differential shows no concerning flags.    MEDICAL HISTORY:  She has a history of bipolar disorder diagnosed during high school and is lactose intolerant.    DIET:  She follows a vegan diet, avoiding all meat products. She does not consume caffeine, noting a long-standing avoidance since high school.      ROS:  General: -fever, -chills, +fatigue, -weight gain, -weight loss  Eyes: -vision changes, -redness, -discharge  ENT: -ear pain, -nasal congestion, -sore throat  Cardiovascular: +chest pain, -palpitations, -lower extremity edema, +feeling of flutter in chest  Respiratory: +cough, -shortness of breath  Gastrointestinal: -abdominal pain, -nausea, -vomiting, -diarrhea, -constipation, -blood in stool,  "+bloating  Genitourinary: -dysuria, -hematuria, -frequency  Musculoskeletal: -joint pain, -muscle pain, +bone pain, +back pain  Skin: -rash, -lesion, +abnormal hair loss  Neurological: -headache, -dizziness, -numbness, -tingling  Psychiatric: +anxiety, -depression, -sleep difficulty  Female Genitourinary: +pelvic pain, +excessive vaginal bleeding         Review of Systems   All other systems reviewed and are negative.        Reviewed family, medical, surgical, and social history.    Objective:      Physical Exam    General: No acute distress. Well-developed. Well-nourished.  Eyes: EOMI. Sclerae anicteric. Conjunctiva pale.  HENT: Normocephalic. Atraumatic. Nares patent. Moist oral mucosa. Pharyngeal erythema. Pharyngeal exudate.  Ears: Bilateral TMs clear. Bilateral EACs clear.  Cardiovascular: Regular rate. Regular rhythm. No murmurs. No rubs. No gallops. Normal S1, S2.  Respiratory: Normal respiratory effort. Clear to auscultation bilaterally. No rales. No rhonchi. No wheezing.  Abdomen: Soft. Non-tender. Non-distended. Normoactive bowel sounds.  Musculoskeletal: No  obvious deformity.  Extremities: No lower extremity edema.  Neurological: Alert & oriented x3. No slurred speech. Normal gait.  Psychiatric: Normal mood. Normal affect. Good insight. Good judgment.  Skin: Warm. Dry. No rash. Nails without moons, white lateral marks, and linear ridging present  Mouth: Tongue smooth and pale. Pale oral mucosa.       /80 (BP Location: Right arm, Patient Position: Sitting)   Pulse 72   Temp 97.9 °F (36.6 °C) (Oral)   Resp 18   Ht 5' 5" (1.651 m)   Wt 79.6 kg (175 lb 6.4 oz)   LMP 06/25/2025 (Approximate)   SpO2 97%   BMI 29.19 kg/m²   Physical Exam    Assessment:       1. Fatigue, unspecified type    2. Thyromegaly    3. Weight gain    4. Encounter to establish care with new provider    5. Deficiency of multiple nutrient elements    6. History of bipolar disorder    7. Abdominal obesity    8. Family history " of thyroid disease in sister    9. Family history of breast cancer in female    10. Screening for diabetes mellitus    11. Screening, lipid    12. Screening for thyroid disorder        Plan:       Assessment & Plan      Visit today included increased complexity associated with the care of the episodic problem listed below addressed and managing the longitudinal care of the patient due to the serious and/or complex managed problem(s) listed below.      F31.9 Bipolar disorder, unspecified  N80.9 Endometriosis, unspecified  E73.9 Lactose intolerance, unspecified  D72.819 Decreased white blood cell count, unspecified  D50.9 Iron deficiency anemia, unspecified  E61.9 Deficiency of nutrient element, unspecified  R10.11 Right upper quadrant pain  R10.2 Pelvic and perineal pain  J02.9 Acute pharyngitis, unspecified  R53.83 Other fatigue  R23.1 Pallor  B34.9 Viral infection, unspecified  Z91.118 Patient's noncompliance with dietary regimen for other reason    BIPOLAR DISORDER:  - Assessed the patient's history of bipolar disorder diagnosis from high school and noted previous negative medication experiences.  - Discussed the patient's concerns about diagnostic accuracy and considered re-evaluation of the diagnosis.    ENDOMETRIOSIS AND PELVIC PAIN:  - Assessed possible etiologies for pelvic pain including endometriosis, adhesions, musculoskeletal issues, and GI-related causes.  - Noted unclertainty about determining the exact cause of symptoms.  - Investigated potential causes for chronic pain in right-sided pelvic, abdominal, flank, and back areas, including shooting pain when walking.  - Evaluated recent transvaginal and abdominal ultrasound results.  - Recommend additional ultrasound and labs for further investigation.    LACTOSE INTOLERANCE:  - Documented the patient's lactose intolerance and avoidance of dairy products.    DECREASED WHITE BLOOD CELL COUNT:  - Monitored the patient's white blood cell count showing  fluctuations from 5.12 in 2023 to 3.78, then 4.71 in April 2024.  - Evaluated CBC results from May 2023 to April 2024, noting fluctuations within normal limits not warranting immediate oncology referral.  - Noted the patient reports fatigue correlating with lower WBC counts.  - Assessed that the WBC count is not alarming and may be related to viral exposure.    IRON DEFICIENCY ANEMIA:  - Monitored physical findings including pale conjunctiva, smooth and pale tongue, and pale palate, suggesting possible iron deficiency.  - Ordered comprehensive laboratory evaluation including iron studies, TIBC, and ferritin levels.    NUTRITIONAL DEFICIENCIES:  - Suspected chronic stress and potential nutritional deficiencies as underlying factors for symptoms including fatigue, hair loss, and various pain complaints.  - Explained how chronic stress can deplete nutritional reserves and affect overall health.  - Ordered comprehensive laboratory evaluation including vitamin D level, vitamin B12 level, and magnesium level.  - Instructed the patient not to start any new supplements until lab results are reviewed.    RIGHT UPPER QUADRANT PAIN:  - Monitored the patient's rib pain that began in 2022, noting severity sometimes prevents wearing a bra.  - Evaluated recent imaging studies including transvaginal and abdominal ultrasounds for abnormalities.  - Noted previous abdominal imaging of gallbladder and liver showed no abnormalities.    PHARYNGITIS:  - Noted the patient's sore throat and cough, which is alleviated by cough drops.    FATIGUE:  - Monitored the patient's fatigue, possibly related to leukopenia and constant stress.    VIRAL INFECTION (SUSPECTED THU-ALMODOVAR):  - Recommend evaluation for Thu-Barr virus or mononucleosis due to the patient's constant stress, history of leukopenia, and fluctuating WBC counts.  - Considered possible Thu-Barr virus reactivation due to chronic stress and discussed its potential impact on  fatigue and immune function.  - Ordered George-Barr virus antibody testing   and additional labs.    DIETARY CONSIDERATIONS:  - Documented the patient's vegan diet and lactose intolerance, noting potential effects on nutritional status and lab results.    FOLLOW-UP AND ADDITIONAL TESTS:  - Ordered thyroid US due to family history and observed neck swelling during exam.  - Patient to schedule thyroid US appointment through patient portal.  - Ordered comprehensive lab work including CBC, CMP, lipid panel, T3, reverse T3, free T4 levels, TSH, A1C, homocysteine level, and urinalysis.  - Patient to complete remaining lab work on the same day as thyroid US.  - Patient to contact the office in 7-10 days for review of results and further recommendations via e-visit message.         1. Fatigue, unspecified type    -     George-Barr Virus (EBV) Antibody Panel; Future; Expected date: 07/29/2025    2. Thyromegaly    -     US Soft Tissue Head Neck; Future; Expected date: 07/29/2025    3. Weight gain    -     T3, Reverse; Future; Expected date: 07/29/2025  -     Insulin, Random; Future; Expected date: 07/29/2025    4. Encounter to establish care with new provider    -     Ambulatory referral/consult to Family Practice    5. Deficiency of multiple nutrient elements    -     Ferritin; Future; Expected date: 07/29/2025  -     Iron and TIBC; Future; Expected date: 07/29/2025  -     Magnesium; Future; Expected date: 07/29/2025  -     Vitamin B12 Deficiency Panel; Future; Expected date: 07/29/2025  -     Vitamin D; Future; Expected date: 07/29/2025  -     Calcitriol; Future; Expected date: 07/29/2025  -     Vitamin C; Future; Expected date: 07/29/2025  -     Zinc; Future; Expected date: 07/29/2025  -     Copper, Serum; Future; Expected date: 07/29/2025  -     Vitamin B1; Future; Expected date: 07/29/2025  -     Vitamin B2; Future; Expected date: 07/29/2025  -     Niacin (vitamin B3); Future; Expected date: 07/29/2025  -     Vitamin  B6; Future; Expected date: 07/29/2025    6. History of bipolar disorder  Noted. Recommend re-evaluation as adult.    7. Abdominal obesity    -     T3, Reverse; Future; Expected date: 07/29/2025  -     Insulin, Random; Future; Expected date: 07/29/2025  -     Homocysteine, Serum; Future; Expected date: 07/29/2025    8. Family history of thyroid disease in sister    -     TSH; Future; Expected date: 07/29/2025  -     T4, Free; Future; Expected date: 07/29/2025  -     T3, Free; Future; Expected date: 07/29/2025  -     Thyroid Peroxidase Antibody; Future; Expected date: 07/29/2025  -     Thyroid Stimulating Immunoglobulin; Future; Expected date: 07/29/2025  -     Thyrotropin Receptor Antibody; Future; Expected date: 07/29/2025    9. Family history of breast cancer in female    -     CBC Auto Differential; Future; Expected date: 07/29/2025  -     Comprehensive Metabolic Panel; Future; Expected date: 07/29/2025    10. Screening for diabetes mellitus    -     Hemoglobin A1C; Future; Expected date: 07/29/2025    11. Screening, lipid    -     Lipid Panel; Future; Expected date: 07/29/2025    12. Screening for thyroid disorder    -     TSH; Future; Expected date: 07/29/2025  -     T4, Free; Future; Expected date: 07/29/2025  -     T3, Free; Future; Expected date: 07/29/2025              Strict return precautions reviewed and patient verbalized understanding. Risks, benefits, and alternatives to the plan were reviewed in detail and all questions answered to the patient's satisfaction. All questions were answered to the fullest satisfaction of the patient, and patient verbalized understanding and agreement to treatment plan. Patient agreed to call with any new or worsening symptoms, or present to the ER.     45 minutes total were spent on today's visit, not limited to but including time based on counseling and coordination of care.    Follow up in about 2 weeks (around 8/12/2025) for results review..      This note was  generated with the assistance of ambient listening technology. Verbal consent was obtained by the patient and accompanying visitor(s) for the recording of patient appointment to facilitate this note. I attest to having reviewed and edited the generated note for accuracy, though some syntax or spelling errors may persist. Please contact the author of this note for any clarification.       Katrin Hassan MD

## 2025-07-30 LAB
EBV EARLY ANTIGEN IGG INTERPRETATION (OHS): POSITIVE
EBV NAG IGG INTERPRETATION (OHS): POSITIVE
EBV VCA IGG SER QL IA: POSITIVE
EBV VCA IGM SER QL IA: NEGATIVE

## 2025-07-31 LAB — VIT B12 SERPL-MCNC: 167 NG/L (ref 180–914)

## 2025-08-01 LAB
NIACIN SERPL-MCNC: <5 NG/ML
NICOTINAMIDE SERPL-MCNC: 19.7 NG/ML (ref 5–48)
NICOTINURATE SERPL-MCNC: <5 NG/ML
W VITAMIN D, 1, 25-DIHYDROXY: 43 PG/ML

## 2025-08-04 LAB
METHYLMALONATE SERPL-SCNC: 0.51 NMOL/ML
W SERUM REVERSE T3: 15.3 NG/DL

## 2025-08-05 ENCOUNTER — HOSPITAL ENCOUNTER (OUTPATIENT)
Dept: RADIOLOGY | Facility: HOSPITAL | Age: 35
Discharge: HOME | End: 2025-08-05
Attending: FAMILY MEDICINE
Payer: MEDICAID

## 2025-08-05 ENCOUNTER — LAB VISIT (OUTPATIENT)
Dept: LAB | Facility: HOSPITAL | Age: 35
End: 2025-08-05
Attending: FAMILY MEDICINE
Payer: MEDICAID

## 2025-08-05 DIAGNOSIS — E01.0 THYROMEGALY: ICD-10-CM

## 2025-08-05 DIAGNOSIS — E65 ABDOMINAL OBESITY: ICD-10-CM

## 2025-08-05 DIAGNOSIS — Z83.49 FAMILY HISTORY OF THYROID DISEASE IN SISTER: ICD-10-CM

## 2025-08-05 DIAGNOSIS — E61.7 DEFICIENCY OF MULTIPLE NUTRIENT ELEMENTS: ICD-10-CM

## 2025-08-05 DIAGNOSIS — R63.5 WEIGHT GAIN: ICD-10-CM

## 2025-08-05 LAB
ANNOTATION COMMENT IMP: NORMAL
GASTRIN SERPL-MCNC: 25 PG/ML
IF BLOCK AB SER QL: NEGATIVE
INSULIN SERPL-ACNC: 6.1 UU/ML

## 2025-08-05 PROCEDURE — 83525 ASSAY OF INSULIN: CPT

## 2025-08-05 PROCEDURE — 76536 US EXAM OF HEAD AND NECK: CPT | Mod: TC

## 2025-08-05 PROCEDURE — 76536 US EXAM OF HEAD AND NECK: CPT | Mod: 26,,, | Performed by: RADIOLOGY

## 2025-08-05 PROCEDURE — 84207 ASSAY OF VITAMIN B-6: CPT

## 2025-08-05 PROCEDURE — 82525 ASSAY OF COPPER: CPT

## 2025-08-05 PROCEDURE — 84630 ASSAY OF ZINC: CPT

## 2025-08-05 PROCEDURE — 83520 IMMUNOASSAY QUANT NOS NONAB: CPT

## 2025-08-05 PROCEDURE — 84425 ASSAY OF VITAMIN B-1: CPT

## 2025-08-05 PROCEDURE — 86376 MICROSOMAL ANTIBODY EACH: CPT

## 2025-08-05 PROCEDURE — 84252 ASSAY OF VITAMIN B-2: CPT

## 2025-08-05 PROCEDURE — 36415 COLL VENOUS BLD VENIPUNCTURE: CPT

## 2025-08-05 PROCEDURE — 82180 ASSAY OF ASCORBIC ACID: CPT

## 2025-08-05 PROCEDURE — 84445 ASSAY OF TSI GLOBULIN: CPT

## 2025-08-06 LAB — THYROPEROXIDASE IGG SERPL-ACNC: <6 IU/ML

## 2025-08-07 ENCOUNTER — PATIENT MESSAGE (OUTPATIENT)
Dept: FAMILY MEDICINE | Facility: CLINIC | Age: 35
End: 2025-08-07
Payer: MEDICAID

## 2025-08-07 PROBLEM — Z86.59 HISTORY OF BIPOLAR DISORDER: Status: ACTIVE | Noted: 2025-08-07

## 2025-08-07 PROBLEM — Z83.49 FAMILY HISTORY OF THYROID DISEASE IN SISTER: Status: ACTIVE | Noted: 2025-08-07

## 2025-08-07 LAB — TSH RECEP AB SER-ACNC: <1.1 IU/L (ref 0–1.75)

## 2025-08-07 NOTE — PATIENT INSTRUCTIONS
Paul Ward,     If you are due for any health screening(s) below please notify me so we can arrange them to be ordered and scheduled. Most healthy patients at your age complete them, but you are free to accept or refuse.     If you can't do it, I'll definitely understand. If you can, I'd certainly appreciate it!    All of your core healthy metrics are met.

## 2025-08-08 LAB
W THYROID STIMULATING IG (TSI): <0.1 IU/L
W VITAMIN C: 7 MG/L
W ZINC: 87 UG/DL

## 2025-08-09 LAB — VIT B2 SERPL-MCNC: 5 MCG/L (ref 1–19)
